# Patient Record
(demographics unavailable — no encounter records)

---

## 2025-01-14 NOTE — PHYSICAL EXAM
[Elvin: _____] : Elvin [unfilled] [Normal External Genitalia] : normal external genitalia [No Scoliosis] : no scoliosis

## 2025-01-14 NOTE — PLAN
[TextEntry] : Continue balanced diet with all food groups. Brush teeth twice a day with toothbrush. Recommend visit to dentist. Maintain consistent daily routines and sleep schedule. Personal hygiene, puberty, and sexual health reviewed. Risky behaviors assessed. School discussed. Limit screen time to no more than 2 hours per day. Encourage physical activity.  Contacts in house behavioral health specialist to discuss therapy    PHQ and MEY reviewed, no concerns.  Cardiac questionnaire reviewed, NO issues.   5-2-1-0 questionnaire reviewed and I discussed components of 5-2-1-0 healthy living with patient and family. Recommended 5 servings of fruits and vegetables a day, less than 2 hours of screen time per day, 1 hour of exercise per day and zero sugar sweetened beverages. Parent(s) have no issues or concerns.   Discussed in the preferred language of English.   Return 1 year for routine well child check or sooner if questions/concerns arise.

## 2025-01-14 NOTE — HISTORY OF PRESENT ILLNESS
[Irregular menses] : no irregular menses [Heavy Bleeding] : no heavy bleeding [Painful Cramps] : no painful cramps [Sleep Concerns] : no sleep concerns [Uses electronic nicotine delivery system] : does not use electronic nicotine delivery system [Exposure to electronic nicotine delivery system] : no exposure to electronic nicotine delivery system [Uses tobacco] : does not use tobacco [Exposure to tobacco] : no exposure to tobacco [Drinks alcohol] : does not drink alcohol [Yes] : Patient has had sexual intercourse. [Has problems with sleep] : does not have problems with sleep [Has thought about hurting self or considered suicide] : has not thought about hurting self or considered suicide [FreeTextEntry7] : 16yr well visit  [de-identified] : many clubs  [de-identified] : has used marijuana before but states does not plan on doing it agagin [de-identified] : uses protection, both male and female, getting IUD next month  [FreeTextEntry1] : NO specialists   SPOKE WITH PATIENT PRIVATELY -- Expressed wanting to see a therapist to discuss and talk over different emotions, feelings of sadness/but sometimes cant pin point the feelings and would like someone to help her move through this   No active SI/HI